# Patient Record
Sex: FEMALE | Race: BLACK OR AFRICAN AMERICAN | HISPANIC OR LATINO
[De-identification: names, ages, dates, MRNs, and addresses within clinical notes are randomized per-mention and may not be internally consistent; named-entity substitution may affect disease eponyms.]

---

## 2020-02-04 PROBLEM — Z00.00 ENCOUNTER FOR PREVENTIVE HEALTH EXAMINATION: Status: ACTIVE | Noted: 2020-02-04

## 2022-11-14 ENCOUNTER — NON-APPOINTMENT (OUTPATIENT)
Age: 57
End: 2022-11-14

## 2022-11-14 ENCOUNTER — APPOINTMENT (OUTPATIENT)
Dept: CARDIOLOGY | Facility: CLINIC | Age: 57
End: 2022-11-14

## 2022-11-14 VITALS
HEIGHT: 64 IN | SYSTOLIC BLOOD PRESSURE: 150 MMHG | WEIGHT: 180 LBS | BODY MASS INDEX: 30.73 KG/M2 | DIASTOLIC BLOOD PRESSURE: 82 MMHG

## 2022-11-14 DIAGNOSIS — Z82.49 FAMILY HISTORY OF ISCHEMIC HEART DISEASE AND OTHER DISEASES OF THE CIRCULATORY SYSTEM: ICD-10-CM

## 2022-11-14 DIAGNOSIS — Z78.9 OTHER SPECIFIED HEALTH STATUS: ICD-10-CM

## 2022-11-14 DIAGNOSIS — Z83.438 FAMILY HISTORY OF OTHER DISORDER OF LIPOPROTEIN METABOLISM AND OTHER LIPIDEMIA: ICD-10-CM

## 2022-11-14 PROCEDURE — 99204 OFFICE O/P NEW MOD 45 MIN: CPT | Mod: 25

## 2022-11-14 PROCEDURE — 93000 ELECTROCARDIOGRAM COMPLETE: CPT

## 2022-11-14 NOTE — HISTORY OF PRESENT ILLNESS
[FreeTextEntry1] : Ms. Bruner is a 58yo F with PMHx of HTN who presents to [facility] care. Her PMD is Dr. Ky Hammer. Patient was recently hiking when she had episode of dizziness. She had been doing fairly strenuous scrambling when the symptoms occurred. She used to do same level of activity prior to COVID, but then has not been as active. She denies CP, palpitations. Some SOB with exertion. Recently started with new PMD and found to have elevated BP and started on amlodipine last week.

## 2022-11-14 NOTE — ASSESSMENT
[FreeTextEntry1] : Dizziness/SOB: Pt with HTN, HLD and family history of CAD, cannot rule out ischemic cause for exertional symptoms. \par -Check exercise stress echo. \par -If no structural disease and continued symptoms, will consider MCOT. \par \par HTN: BP not at goal per ACC/AHA 2018 guidelines\par -Continue with amlodipine 10mg PO daily. \par -Pt will continue to check BP at home. \par -Counselled on lifestyle modification including diet and exercise to aid in BP lowering. \par \par HLD: TC 270s, LDL 170s (11/22) ASCVD 9% 10 year risk (intermediate)\par -Discussed therapeutic lifestyle changes to promote improved lipid metabolism\par -Not currently candidate for medical therapy. \par -Elevated ASCVD risk.\par -Will defer medication at this time while patient works on lifestyle modification. \par \par Follow up in 6 weeks.

## 2022-11-14 NOTE — REVIEW OF SYSTEMS
[Negative] : Heme/Lymph [Dizziness] : dizziness [SOB] : shortness of breath [Dyspnea on exertion] : dyspnea during exertion [Chest Discomfort] : chest discomfort [Lower Ext Edema] : no extremity edema [PND] : no PND

## 2022-11-14 NOTE — REASON FOR VISIT
[Other: ____] : [unfilled] [FreeTextEntry1] : Diagnostic Tests:\par ---------------------\par EKG: \par 11/14/22-NSR. Low voltage, precordial. TWF.

## 2022-11-28 ENCOUNTER — APPOINTMENT (OUTPATIENT)
Dept: CARDIOLOGY | Facility: CLINIC | Age: 57
End: 2022-11-28

## 2023-01-06 ENCOUNTER — APPOINTMENT (OUTPATIENT)
Dept: CARDIOLOGY | Facility: CLINIC | Age: 58
End: 2023-01-06
Payer: COMMERCIAL

## 2023-01-06 PROCEDURE — 93325 DOPPLER ECHO COLOR FLOW MAPG: CPT

## 2023-01-06 PROCEDURE — 93320 DOPPLER ECHO COMPLETE: CPT

## 2023-01-06 PROCEDURE — 93351 STRESS TTE COMPLETE: CPT

## 2023-01-06 RX ORDER — HYDROCHLOROTHIAZIDE 25 MG/1
25 TABLET ORAL
Refills: 0 | Status: ACTIVE | COMMUNITY
Start: 2023-01-06

## 2023-01-23 ENCOUNTER — APPOINTMENT (OUTPATIENT)
Dept: CARDIOLOGY | Facility: CLINIC | Age: 58
End: 2023-01-23
Payer: COMMERCIAL

## 2023-01-23 VITALS
WEIGHT: 181 LBS | SYSTOLIC BLOOD PRESSURE: 142 MMHG | DIASTOLIC BLOOD PRESSURE: 86 MMHG | BODY MASS INDEX: 30.9 KG/M2 | HEIGHT: 64 IN

## 2023-01-23 PROCEDURE — 99213 OFFICE O/P EST LOW 20 MIN: CPT

## 2023-01-23 NOTE — REASON FOR VISIT
[Other: ____] : [unfilled] [FreeTextEntry1] : Diagnostic Tests:\par ---------------------\par EKG: \par 11/14/22-NSR. Low voltage, precordial. TWF. \par ---------------------\par Echo: \par 01/06/23-TTE: EF 62%. Possible LV apical aneurysm. Trace PI. \par ---------------------\par Stress:\par 01/06/23-Exercise TTE: Exercise 7:54 min. 94% MPHR. METS 10.3. Negative EKG and TTE for ischemia.

## 2023-01-23 NOTE — HISTORY OF PRESENT ILLNESS
[FreeTextEntry1] : Ms. Bruner is a 58yo F with PMHx of HTN who presents for follow up. Her PMD is Dr. Ky Hammer. Patient was recently hiking when she had episode of dizziness. She had been doing fairly strenuous scrambling when the symptoms occurred. She used to do same level of activity prior to COVID, but then has not been as active. She denies CP, palpitations. Some SOB with exertion. \par -Pt underwent stress which was normal but there was possible LV apical aneurysm. She denies further SOB and dizziness. BP still elevated at home.

## 2023-01-23 NOTE — ASSESSMENT
[FreeTextEntry1] : Dizziness/SOB: Pt with HTN, HLD and family history of CAD, cannot rule out ischemic cause for exertional symptoms. \par -Negative stress echo. \par -Possible LV apical aneurysm. Check limited TTE with [facility] to further characterize. \par -If no structural disease and continued symptoms, will consider MCOT. \par \par HTN: BP not at goal per ACC/AHA 2018 guidelines\par -Switch amlodipine to nifedipine 60mg PO daily.\par -Continue with HCTZ 25mg PO daily. \par -Pt will continue to check BP at home. \par -Counselled on lifestyle modification including diet and exercise to aid in BP lowering. \par \par HLD: TC 270s, LDL 170s (11/22) ASCVD 9% 10 year risk (intermediate)\par -Discussed therapeutic lifestyle changes to promote improved lipid metabolism\par -Not currently candidate for medical therapy. \par -Elevated ASCVD risk.\par -Will defer medication at this time while patient works on lifestyle modification. \par \par LV apical aneurysm: Rule out. \par -Given normal stress and asymptomatic, unclear if truly aneurysm.\par -Check limited TTE with [facility] to further characterize. \par \par Follow up in 6 weeks.

## 2023-01-23 NOTE — REVIEW OF SYSTEMS
[SOB] : shortness of breath [Dyspnea on exertion] : dyspnea during exertion [Chest Discomfort] : chest discomfort [Dizziness] : dizziness [Negative] : Heme/Lymph [Lower Ext Edema] : no extremity edema [PND] : no PND

## 2023-02-27 RX ORDER — NIFEDIPINE 60 MG/1
60 TABLET, EXTENDED RELEASE ORAL DAILY
Qty: 90 | Refills: 3 | Status: DISCONTINUED | COMMUNITY
Start: 2023-01-23 | End: 2023-02-27

## 2023-02-28 ENCOUNTER — APPOINTMENT (OUTPATIENT)
Dept: CARDIOLOGY | Facility: CLINIC | Age: 58
End: 2023-02-28

## 2023-03-06 ENCOUNTER — APPOINTMENT (OUTPATIENT)
Dept: CARDIOLOGY | Facility: CLINIC | Age: 58
End: 2023-03-06

## 2023-03-10 ENCOUNTER — APPOINTMENT (OUTPATIENT)
Dept: CARDIOLOGY | Facility: CLINIC | Age: 58
End: 2023-03-10
Payer: COMMERCIAL

## 2023-03-10 PROCEDURE — 93306 TTE W/DOPPLER COMPLETE: CPT

## 2023-03-30 ENCOUNTER — APPOINTMENT (OUTPATIENT)
Dept: CARDIOLOGY | Facility: CLINIC | Age: 58
End: 2023-03-30
Payer: COMMERCIAL

## 2023-03-30 VITALS — HEART RATE: 73 BPM | SYSTOLIC BLOOD PRESSURE: 138 MMHG | DIASTOLIC BLOOD PRESSURE: 80 MMHG

## 2023-03-30 VITALS — DIASTOLIC BLOOD PRESSURE: 74 MMHG | SYSTOLIC BLOOD PRESSURE: 132 MMHG

## 2023-03-30 VITALS — WEIGHT: 181 LBS | TEMPERATURE: 97.6 F | BODY MASS INDEX: 30.9 KG/M2 | HEIGHT: 64 IN

## 2023-03-30 DIAGNOSIS — R93.1 ABNORMAL FINDINGS ON DIAGNOSTIC IMAGING OF HEART AND CORONARY CIRCULATION: ICD-10-CM

## 2023-03-30 DIAGNOSIS — I10 ESSENTIAL (PRIMARY) HYPERTENSION: ICD-10-CM

## 2023-03-30 DIAGNOSIS — R07.9 CHEST PAIN, UNSPECIFIED: ICD-10-CM

## 2023-03-30 DIAGNOSIS — R42 DIZZINESS AND GIDDINESS: ICD-10-CM

## 2023-03-30 PROCEDURE — 99213 OFFICE O/P EST LOW 20 MIN: CPT | Mod: 25

## 2023-03-30 PROCEDURE — 93000 ELECTROCARDIOGRAM COMPLETE: CPT

## 2023-03-30 RX ORDER — AMLODIPINE BESYLATE 10 MG/1
10 TABLET ORAL
Qty: 90 | Refills: 0 | Status: DISCONTINUED | COMMUNITY
Start: 2022-11-07 | End: 2023-03-30

## 2023-03-30 NOTE — ASSESSMENT
[FreeTextEntry1] : Dizziness/SOB: Pt with HTN, HLD and family history of CAD, cannot rule out ischemic cause for exertional symptoms. \par -Negative stress echo. \par - No LV aneurysm \par -If no structural disease and continued symptoms, will consider MCOT. \par \par HTN: BP at goal per ACC/AHA 2018 guidelines\par - patient didn't tolerate Nifedipine, was switched to Valsartan \par -Continue with HCTZ 25mg PO daily and valsartan 80mg PO daily. \par -Pt will continue to check BP at home. \par -Counselled on lifestyle modification including diet and exercise to aid in BP lowering. \par \par HLD: TC 270s, LDL 170s (11/22) ASCVD 9% 10 year risk (intermediate)\par -Discussed therapeutic lifestyle changes to promote improved lipid metabolism\par -Not currently candidate for medical therapy. \par -Elevated ASCVD risk.\par -Will defer medication at this time while patient works on lifestyle modification. \par \par Follow up in 6 months\par -Check labs prior to.

## 2023-03-30 NOTE — HISTORY OF PRESENT ILLNESS
[FreeTextEntry1] : Ms. Bruner is a 58yo F with PMHx of HTN who presents for follow up. Her PMD is Dr. Ky Hammer. Patient was recently hiking when she had episode of dizziness. She had been doing fairly strenuous scrambling when the symptoms occurred. She used to do same level of activity prior to COVID, but then has not been as active. She denies CP, palpitations. Some SOB with exertion. \par -Pt underwent stress which was normal but there was possible LV apical aneurysm. She denies further SOB and dizziness. BP still elevated at home. \par 03/30/23-Patient feeling well. Had been started on nifedipine but was having palpitations. She got EKG at work which was normal. Since stopping nifedipine, she is feeling better. BP better controlled. No LV apical aneurysm visualized on TTE with contrast.

## 2023-03-30 NOTE — REVIEW OF SYSTEMS
[SOB] : no shortness of breath [Dyspnea on exertion] : not dyspnea during exertion [Chest Discomfort] : no chest discomfort [Lower Ext Edema] : no extremity edema [PND] : no PND [Dizziness] : no dizziness

## 2023-03-30 NOTE — REASON FOR VISIT
[FreeTextEntry1] : Diagnostic Tests:\par ---------------------\par EKG: \par 03/30/23-NSR. Low voltage, precordial. \par 11/14/22-NSR. Low voltage, precordial. TWF. \par ---------------------\par Echo: \par 03/10/23-Limited TTE: EF 60-65%. No LV apical aneurysm visualized. \par 01/06/23-TTE: EF 62%. Possible LV apical aneurysm. Trace PI. \par ---------------------\par Stress:\par 01/06/23-Exercise TTE: Exercise 7:54 min. 94% MPHR. METS 10.3. Negative EKG and TTE for ischemia.

## 2023-04-04 ENCOUNTER — EMERGENCY (EMERGENCY)
Facility: HOSPITAL | Age: 58
LOS: 1 days | Discharge: HOME | End: 2023-04-04
Attending: EMERGENCY MEDICINE | Admitting: EMERGENCY MEDICINE
Payer: COMMERCIAL

## 2023-04-04 VITALS
HEART RATE: 71 BPM | SYSTOLIC BLOOD PRESSURE: 123 MMHG | DIASTOLIC BLOOD PRESSURE: 74 MMHG | TEMPERATURE: 98 F | OXYGEN SATURATION: 99 % | RESPIRATION RATE: 17 BRPM

## 2023-04-04 VITALS
TEMPERATURE: 98 F | HEART RATE: 60 BPM | OXYGEN SATURATION: 100 % | HEIGHT: 64 IN | DIASTOLIC BLOOD PRESSURE: 70 MMHG | WEIGHT: 179.9 LBS | RESPIRATION RATE: 16 BRPM | SYSTOLIC BLOOD PRESSURE: 129 MMHG

## 2023-04-04 LAB
ALBUMIN SERPL ELPH-MCNC: 3.8 G/DL — SIGNIFICANT CHANGE UP (ref 3.4–5)
ALP SERPL-CCNC: 71 U/L — SIGNIFICANT CHANGE UP (ref 40–120)
ALT FLD-CCNC: 24 U/L — SIGNIFICANT CHANGE UP (ref 12–42)
ANION GAP SERPL CALC-SCNC: 7 MMOL/L — LOW (ref 9–16)
AST SERPL-CCNC: 22 U/L — SIGNIFICANT CHANGE UP (ref 15–37)
BASOPHILS # BLD AUTO: 0.02 K/UL — SIGNIFICANT CHANGE UP (ref 0–0.2)
BASOPHILS NFR BLD AUTO: 0.2 % — SIGNIFICANT CHANGE UP (ref 0–2)
BILIRUB SERPL-MCNC: 0.4 MG/DL — SIGNIFICANT CHANGE UP (ref 0.2–1.2)
BUN SERPL-MCNC: 17 MG/DL — SIGNIFICANT CHANGE UP (ref 7–23)
CALCIUM SERPL-MCNC: 9.1 MG/DL — SIGNIFICANT CHANGE UP (ref 8.5–10.5)
CHLORIDE SERPL-SCNC: 104 MMOL/L — SIGNIFICANT CHANGE UP (ref 96–108)
CO2 SERPL-SCNC: 29 MMOL/L — SIGNIFICANT CHANGE UP (ref 22–31)
CREAT SERPL-MCNC: 1.12 MG/DL — SIGNIFICANT CHANGE UP (ref 0.5–1.3)
EGFR: 57 ML/MIN/1.73M2 — LOW
EOSINOPHIL # BLD AUTO: 0.06 K/UL — SIGNIFICANT CHANGE UP (ref 0–0.5)
EOSINOPHIL NFR BLD AUTO: 0.6 % — SIGNIFICANT CHANGE UP (ref 0–6)
GLUCOSE SERPL-MCNC: 125 MG/DL — HIGH (ref 70–99)
HCT VFR BLD CALC: 39.1 % — SIGNIFICANT CHANGE UP (ref 34.5–45)
HGB BLD-MCNC: 12.3 G/DL — SIGNIFICANT CHANGE UP (ref 11.5–15.5)
IMM GRANULOCYTES NFR BLD AUTO: 0.3 % — SIGNIFICANT CHANGE UP (ref 0–0.9)
LYMPHOCYTES # BLD AUTO: 1.36 K/UL — SIGNIFICANT CHANGE UP (ref 1–3.3)
LYMPHOCYTES # BLD AUTO: 14.4 % — SIGNIFICANT CHANGE UP (ref 13–44)
MCHC RBC-ENTMCNC: 26.3 PG — LOW (ref 27–34)
MCHC RBC-ENTMCNC: 31.5 GM/DL — LOW (ref 32–36)
MCV RBC AUTO: 83.7 FL — SIGNIFICANT CHANGE UP (ref 80–100)
MONOCYTES # BLD AUTO: 0.54 K/UL — SIGNIFICANT CHANGE UP (ref 0–0.9)
MONOCYTES NFR BLD AUTO: 5.7 % — SIGNIFICANT CHANGE UP (ref 2–14)
NEUTROPHILS # BLD AUTO: 7.44 K/UL — HIGH (ref 1.8–7.4)
NEUTROPHILS NFR BLD AUTO: 78.8 % — HIGH (ref 43–77)
NRBC # BLD: 0 /100 WBCS — SIGNIFICANT CHANGE UP (ref 0–0)
PLATELET # BLD AUTO: 295 K/UL — SIGNIFICANT CHANGE UP (ref 150–400)
POTASSIUM SERPL-MCNC: 3.5 MMOL/L — SIGNIFICANT CHANGE UP (ref 3.5–5.3)
POTASSIUM SERPL-SCNC: 3.5 MMOL/L — SIGNIFICANT CHANGE UP (ref 3.5–5.3)
PROT SERPL-MCNC: 7.6 G/DL — SIGNIFICANT CHANGE UP (ref 6.4–8.2)
RBC # BLD: 4.67 M/UL — SIGNIFICANT CHANGE UP (ref 3.8–5.2)
RBC # FLD: 13.5 % — SIGNIFICANT CHANGE UP (ref 10.3–14.5)
SARS-COV-2 RNA SPEC QL NAA+PROBE: SIGNIFICANT CHANGE UP
SODIUM SERPL-SCNC: 140 MMOL/L — SIGNIFICANT CHANGE UP (ref 132–145)
TROPONIN I, HIGH SENSITIVITY RESULT: 4.3 NG/L — SIGNIFICANT CHANGE UP
WBC # BLD: 9.45 K/UL — SIGNIFICANT CHANGE UP (ref 3.8–10.5)
WBC # FLD AUTO: 9.45 K/UL — SIGNIFICANT CHANGE UP (ref 3.8–10.5)

## 2023-04-04 PROCEDURE — 99285 EMERGENCY DEPT VISIT HI MDM: CPT

## 2023-04-04 RX ORDER — SODIUM CHLORIDE 9 MG/ML
1000 INJECTION INTRAMUSCULAR; INTRAVENOUS; SUBCUTANEOUS ONCE
Refills: 0 | Status: COMPLETED | OUTPATIENT
Start: 2023-04-04 | End: 2023-04-04

## 2023-04-04 RX ADMIN — SODIUM CHLORIDE 1000 MILLILITER(S): 9 INJECTION INTRAMUSCULAR; INTRAVENOUS; SUBCUTANEOUS at 09:04

## 2023-04-04 NOTE — ED PROVIDER NOTE - NSFOLLOWUPINSTRUCTIONS_ED_ALL_ED_FT
Syncope, Adult  Outline of the head showing blood vessels that supply the brain.  Syncope refers to a condition in which a person temporarily loses consciousness. Syncope may also be called fainting or passing out. It is caused by a sudden decrease in blood flow to the brain. This can happen for a variety of reasons.    Most causes of syncope are not dangerous. It can be triggered by things such as needle sticks, seeing blood, pain, or intense emotion. However, syncope can also be a sign of a serious medical problem, such as a heart abnormality. Other causes can include dehydration, migraines, or taking medicines that lower blood pressure. Your health care provider may do tests to find the reason why you are having syncope.    If you faint, get medical help right away. Call your local emergency services (911 in the [place]    Follow these instructions at home:  Pay attention to any changes in your symptoms. Take these actions to stay safe and to help relieve your symptoms:    Knowing when you may be about to faint    Signs that you may be about to faint include:  Feeling dizzy, weak, light-headed, or like the room is spinning.  Feeling nauseous.  Seeing spots or seeing all white or all black in your field of vision.  Having cold, clammy skin or feeling warm and sweaty.  Hearing ringing in the ears (tinnitus).  If you start to feel like you might faint, sit or lie down right away. If sitting, put your head down between your legs. If lying down, raise (elevate) your feet above the level of your heart.  Breathe deeply and steadily. Wait until all the symptoms have passed.  Have someone stay with you until you feel stable.  Medicines    Take over-the-counter and prescription medicines only as told by your health care provider.  If you are taking blood pressure or heart medicine, get up slowly and take several minutes to sit and then stand. This can reduce dizziness and decrease the risk of syncope.  Lifestyle    Do not drive, use machinery, or play sports until your health care provider says it is okay.  Do not drink alcohol.  Do not use any products that contain nicotine or tobacco. These products include cigarettes, chewing tobacco, and vaping devices, such as e-cigarettes. If you need help quitting, ask your health care provider.  Avoid hot tubs and saunas.  General instructions    Talk with your health care provider about your symptoms. You may need to have testing to understand the cause of your syncope.  Drink enough fluid to keep your urine pale yellow.  Avoid prolonged standing. If you must stand for a long time, do movements such as:  Moving your legs.  Crossing your legs.  Flexing and stretching your leg muscles.  Squatting.  Keep all follow-up visits. This is important.  Contact a health care provider if:  You have episodes of near fainting.  Get help right away if:  You faint.  You hit your head or are injured after fainting.  You have any of these symptoms that may indicate trouble with your heart:  Fast or irregular heartbeats (palpitations).  Unusual pain in your chest, abdomen, or back.  Shortness of breath.  You have a seizure.  You have a severe headache.  You are confused.  You have vision problems.  You have severe weakness or trouble walking.  You are bleeding from your mouth or rectum, or you have black or tarry stool.  These symptoms may represent a serious problem that is an emergency. Do not wait to see if your symptoms will go away. Get medical help right away. Call your local emergency services (911 in the [place] Do not drive yourself to the hospital.    Summary  Syncope refers to a condition in which a person temporarily loses consciousness. Syncope may also be called fainting or passing out. It is caused by a sudden decrease in blood flow to the brain.  Signs that you may be about to faint include dizziness, feeling light-headed, feeling nauseous, sudden vision changes, or cold, clammy skin.  Even though most causes of syncope are not dangerous, syncope can be a sign of a serious medical problem. Get help right away if you faint.  If you start to feel like you might faint, sit or lie down right away. If sitting, put your head down between your legs. If lying down, raise (elevate) your feet above the level of your heart.

## 2023-04-04 NOTE — ED ADULT TRIAGE NOTE - CHIEF COMPLAINT QUOTE
Pt BIBA from [facility] terminal s/p witnessed syncopal episode. Pt arrives A&Ox4 with VSS. Pt BIBA from [facility] terminal s/p witnessed syncopal episode. Pt arrives A&Ox4 with VSS. Pt BIBA from [facility] terminal s/p witnessed syncopal episode. Pt arrives A&Ox4 with VSS.

## 2023-04-04 NOTE — ED PROVIDER NOTE - CLINICAL SUMMARY MEDICAL DECISION MAKING FREE TEXT BOX
Negative workup for ACS, NSR on EKG, VSS, hydrated with 1L NS, tolerating PO. will d/c home to f/u with her cardiologist as an outpt. Advised to stop intermittent fasting for the next few days.

## 2023-04-04 NOTE — ED PROVIDER NOTE - CROS ED ROS STATEMENT
Unchanged. Discussed diet and exercise. Recommend low carb diet. Assess in 100 days.    all other ROS negative except as per HPI

## 2023-04-04 NOTE — ED PROVIDER NOTE - OBJECTIVE STATEMENT
56 y/o F w/hx HTN compliant with meds (no recent changes), has started to intermittently fast to lose weight [place], when she became lightheaded and globally fatigued, 58 y/o F w/hx HTN compliant with meds (no recent changes), has started to intermittently fast to lose weight and [place], [place], when she became lightheaded and globally fatigued, 58 y/o F w/hx HTN compliant with meds (no recent changes), has started to intermittently fast to lose weight and [place], [place], when she became lightheaded and globally fatigued and syncopized with full LOC for several seconds. No collapse/trauma/head strike. Woke w/out confusion or significant AMS, only endorsing fatigue. In the ED feeling back to her normal state of health. Denies CP/palpitations/SOB. No LE pain/swelling. No recent illness or fever/chills. No changes to BP meds. + syncope 30 yrs ago. Negative cardiac stress and normal ECHO in the past 6mo. 56 y/o F w/hx HTN compliant with meds (no recent changes), has started to intermittently fast to lose weight [place], when she became lightheaded and globally fatigued and syncopized with full LOC for several seconds. No collapse/trauma/head strike. Woke w/out confusion or significant AMS, only endorsing fatigue. In the ED feeling back to her normal state of health. Denies CP/palpitations/SOB. No LE pain/swelling. No recent illness or fever/chills. No changes to BP meds. + syncope 30 yrs ago. Negative cardiac stress and normal ECHO in the past 6mo.

## 2023-04-04 NOTE — ED ADULT NURSE NOTE - CHIEF COMPLAINT QUOTE
Pt BIBA from [facility] terminal s/p witnessed syncopal episode. Pt arrives A&Ox4 with VSS. Pt BIBA from [facility] terminal s/p witnessed syncopal episode. Pt arrives A&Ox4 with VSS. Pt BIBA from [facility] terminal s/p witnessed syncopal episode. Pt arrives A&Ox4 with VSS.

## 2023-04-04 NOTE — ED PROVIDER NOTE - PATIENT PORTAL LINK FT
You can access the FollowMyHealth Patient Portal offered by [facility] by registering at the following website: [email or web address]. By joining [employer]’s FollowMyHealth portal, you will also be able to view your health information using other applications (apps) compatible with our system. You can access the FollowMyHealth Patient Portal offered by [facility] by registering at the following website: [email or web address]. By joining [employer]’s FollowMyHealth portal, you will also be able to view your health information using other applications (apps) compatible with our system. You can access the FollowMyHealth Patient Portal offered by [facility] by registering at the following website: [email or web address]. By joining [employer]’s FollowMyHealth portal, you will also be able to view your health information using other applications (apps) compatible with our system.

## 2023-04-04 NOTE — ED ADULT NURSE NOTE - NSIMPLEMENTINTERV_GEN_ALL_ED
Implemented All Universal Safety Interventions:  [place] to call system. Call bell, personal items and telephone within reach. Instruct patient to call for assistance. Room bathroom lighting operational. Non-slip footwear when patient is off stretcher. Physically safe environment: no spills, clutter or unnecessary equipment. Stretcher in lowest position, wheels locked, appropriate side rails in place. Implemented All Universal Safety Interventions:  [place] to call system. Call bell, personal items and telephone within reach. Instruct patient to call for assistance. Room bathroom lighting operational. Non-slip footwear when patient is off stretcher. Physically safe environment: no spills, clutter or unnecessary equipment. Stretcher in lowest position, wheels locked, appropriate side rails in place. Implemented All Universal Safety Interventions:  [place] to call system. Call bell, personal items and telephone within reach. Instruct patient to call for assistance. Room bathroom lighting operational. Non-slip footwear when patient is off stretcher. Physically safe environment: no spills, clutter or unnecessary equipment. Stretcher in lowest position, wheels locked, appropriate side rails in place.

## 2023-04-04 NOTE — ED ADULT NURSE NOTE - OBJECTIVE STATEMENT
syncopal episode apx 1.5hrs ago, witnessed by friend at bedside. no s/s of distress, awaiting provider eval

## 2023-04-06 DIAGNOSIS — R42 DIZZINESS AND GIDDINESS: ICD-10-CM

## 2023-04-06 DIAGNOSIS — I10 ESSENTIAL (PRIMARY) HYPERTENSION: ICD-10-CM

## 2023-04-06 DIAGNOSIS — Z20.822 CONTACT WITH AND (SUSPECTED) EXPOSURE TO COVID-19: ICD-10-CM

## 2023-04-06 DIAGNOSIS — R55 SYNCOPE AND COLLAPSE: ICD-10-CM

## 2023-04-06 DIAGNOSIS — R53.83 OTHER FATIGUE: ICD-10-CM

## 2023-09-13 ENCOUNTER — APPOINTMENT (OUTPATIENT)
Dept: CARDIOLOGY | Facility: CLINIC | Age: 58
End: 2023-09-13

## 2024-02-19 ENCOUNTER — RX RENEWAL (OUTPATIENT)
Age: 59
End: 2024-02-19

## 2024-02-19 RX ORDER — VALSARTAN 80 MG/1
80 TABLET, COATED ORAL DAILY
Qty: 90 | Refills: 3 | Status: ACTIVE | COMMUNITY
Start: 2023-02-27 | End: 1900-01-01